# Patient Record
(demographics unavailable — no encounter records)

---

## 2025-01-02 NOTE — CONSULT LETTER
[Dear  ___] : Dear  [unfilled], [Please see my note below.] : Please see my note below. [Sincerely,] : Sincerely, [FreeTextEntry1] : Thank you for sending  ABEBA MUNOZ  to me for neurological evaluation. This is an initial encounter with a new pt. [FreeTextEntry3] : Dr Miller

## 2025-01-02 NOTE — HISTORY OF PRESENT ILLNESS
[FreeTextEntry1] : 17 year old male with Dx of Autistic spectrum disorder, currently in an [employer] 12th grade class with no supportive services. Pt use d to get ST and OT for years, up until 9th grade. On no meds. NKA. Now applying for [facility] services. [facility] -ve for ASD or epilepsy. Birth: FT C/S no complications.

## 2025-01-02 NOTE — DISCUSSION/SUMMARY
[FreeTextEntry1] : Autistic spectrum disorder. Will get EEG, HO and Neuropsych evaluation. RTO prn. Note sent to Dr Hwang(PCP). Total clinician time spent on 1/2/2025 is 48 minutes including preparing to see the patient, obtaining and/or reviewing and confirming history, performing a medically necessary and appropriate examination, counseling and educating the patient and/or family, documenting clinical information in the EHR and communicating and/or referring to other healthcare professionals.

## 2025-01-02 NOTE — PHYSICAL EXAM
[FreeTextEntry1] : Alert, NAD. Flat affect. Obeyed commands. Intermittent eye contact. Said a few phrases. Heart sounds NL. Neck FROM. PERRL, EOMI, face symmetric, hearing grossly intact. Tone, power, gait NL. No nystagmus or tremor.